# Patient Record
Sex: FEMALE | Race: BLACK OR AFRICAN AMERICAN
[De-identification: names, ages, dates, MRNs, and addresses within clinical notes are randomized per-mention and may not be internally consistent; named-entity substitution may affect disease eponyms.]

---

## 2017-01-17 ENCOUNTER — HOSPITAL ENCOUNTER (EMERGENCY)
Dept: HOSPITAL 47 - EC | Age: 27
Discharge: HOME | End: 2017-01-17
Payer: COMMERCIAL

## 2017-01-17 VITALS — HEART RATE: 79 BPM | SYSTOLIC BLOOD PRESSURE: 121 MMHG | DIASTOLIC BLOOD PRESSURE: 68 MMHG | TEMPERATURE: 98.1 F

## 2017-01-17 VITALS — RESPIRATION RATE: 18 BRPM

## 2017-01-17 DIAGNOSIS — M54.5: ICD-10-CM
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
## 2017-01-17 DIAGNOSIS — S16.1XXA: Primary | ICD-10-CM

## 2017-01-17 DIAGNOSIS — R51: ICD-10-CM

## 2017-01-17 DIAGNOSIS — F17.200: ICD-10-CM

## 2017-01-17 PROCEDURE — 71020: CPT

## 2017-01-17 PROCEDURE — 93005 ELECTROCARDIOGRAM TRACING: CPT

## 2017-01-17 PROCEDURE — 96372 THER/PROPH/DIAG INJ SC/IM: CPT

## 2017-01-17 PROCEDURE — 99284 EMERGENCY DEPT VISIT MOD MDM: CPT

## 2017-01-17 PROCEDURE — 72050 X-RAY EXAM NECK SPINE 4/5VWS: CPT

## 2017-01-17 PROCEDURE — 72100 X-RAY EXAM L-S SPINE 2/3 VWS: CPT

## 2017-01-17 NOTE — XR
EXAMINATION TYPE: XR cervical spine comp

 

DATE OF EXAM: 1/17/2017 5:33 PM

 

COMPARISON: NONE

 

HISTORY: Neck pain

 

TECHNIQUE: 5 views

 

FINDINGS: The cervical vertebra have normal spacing and alignment. Posterior elements are intact. Brent
antoaxial facet joint is normal. There are no cervical ribs.

 

IMPRESSION: Normal cervical spine.

## 2017-01-17 NOTE — XR
EXAMINATION TYPE: XR chest 2V

 

DATE OF EXAM: 1/17/2017 5:33 PM

 

COMPARISON: 3/8/2012

 

HISTORY: Back pain

 

TECHNIQUE:  Frontal and lateral views of the chest are obtained.

 

FINDINGS:  Heart and mediastinum are normal. Lungs are clear. Costophrenic angles are clear. There ar
e no hilar masses. Bony thorax is intact. Exam is limited by patient size. There are chest leads.

 

IMPRESSION:  No active cardiopulmonary disease. No change.
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
## 2017-01-17 NOTE — XR
EXAMINATION TYPE: XR lumbar spine 2 or 3V

 

DATE OF EXAM: 1/17/2017 5:33 PM

 

COMPARISON: NONE

 

HISTORY: Back pain

 

TECHNIQUE: 3 views

 

FINDINGS: The lumbar vertebra have normal spacing and alignment. Posterior elements appear intact. Sa
croiliac joints appear normal. There is some narrowing and mild spurring at T12-L1 disc space.

 

IMPRESSION: Negative lumbar spine exam. There is mild disc space narrowing noted at T12-L1.

## 2018-10-04 ENCOUNTER — HOSPITAL ENCOUNTER (EMERGENCY)
Dept: HOSPITAL 47 - EC | Age: 28
Discharge: HOME | End: 2018-10-04
Payer: COMMERCIAL

## 2018-10-04 VITALS — HEART RATE: 75 BPM | SYSTOLIC BLOOD PRESSURE: 131 MMHG | DIASTOLIC BLOOD PRESSURE: 91 MMHG | TEMPERATURE: 98.6 F

## 2018-10-04 VITALS — RESPIRATION RATE: 16 BRPM

## 2018-10-04 DIAGNOSIS — F17.200: ICD-10-CM

## 2018-10-04 DIAGNOSIS — Z32.01: Primary | ICD-10-CM

## 2018-10-04 DIAGNOSIS — Z98.51: ICD-10-CM

## 2018-10-04 PROCEDURE — 99282 EMERGENCY DEPT VISIT SF MDM: CPT

## 2018-10-04 PROCEDURE — 81025 URINE PREGNANCY TEST: CPT

## 2018-10-04 NOTE — ED
General Adult HPI





- General


Chief complaint: Recheck/Abnormal Lab/Rx


Stated complaint: Female 


Source: patient


Mode of arrival: ambulatory


Limitations: no limitations





- History of Present Illness


Initial comments: 





Dictation was produced using dragon dictation software. please excuse any 

grammatical, word or spelling errors. 





Chief Complaint: 28-year-old  [place] female  presents with positive 

urine pregnancy tests at home despite bilateral tubal ligation.





History of Present Illness: 28-year-old female sexually active.  She presents 

with a positive urine pregnancy test.  She states that she missed her period 

which was also performed days ago.  She's had a bilateral tubal ligation 

approximately 7 years ago.  She checked a urine pregnancy test which was 

positive at home.  Patient has no other complaints at this time.








The ROS documented in this emergency department record has been reviewed and 

confirmed by me.  Those systems with pertinent positive or negative responses 

have been documented in the HPI.  All other systems are other negative and/or 

noncontributory.





- Related Data


 Home Medications











 Medication  Instructions  Recorded  Confirmed


 


Multivitamin with Iron 1 tab PO DAILY 10/04/18 10/04/18





[Multivitamins with Iron]   











 Allergies











Allergy/AdvReac Type Severity Reaction Status Date / Time


 


No Known Allergies Allergy   Verified 10/04/18 21:51














Review of Systems


ROS Statement: 


Those systems with pertinent positive or pertinent negative responses have been 

documented in the HPI.





ROS Other: All systems not noted in ROS Statement are negative.





Past Medical History


Past Medical History: No Reported History


History of Any Multi-Drug Resistant Organisms: None Reported


Past Surgical History: Cholecystectomy, Tubal Ligation


Past Psychological History: No Psychological Hx Reported


Smoking Status: Current every day smoker


Past Alcohol Use History: Occasional


Past Drug Use History: None Reported





General Exam





- General Exam Comments


Initial Comments: 














PHYSICAL EXAM:


General Impression: Alert and oriented x3, not in acute distress


HEENT: Normocephalic atraumatic, extra-ocular movements intact, pupils equal 

and reactive to light bilaterally, mucous membranes moist.


Cardiovascular: Heart regular rate and rhythm, S1&S2 audible, no murmurs, rubs 

or gallops


Chest: Lungs clear to auscultation bilaterally, no rhonchi, no wheeze, no rales


Abdomen: Bowel sounds present, abdomen soft, non-tender, non-distended, no 

organomegaly


Musculoskeletal: Pulses present and equal in all extremities, no peripheral 

edema


Motor: Power 5/5 bilaterally, no focal deficits noted


Neurological: CN II-XII grossly intact, no focal motor or sensory deficits noted


Skin: Intact with no visualized rashes


Psych: Normal affect and mood


Limitations: no limitations





Course


 Vital Signs











  10/04/18





  21:34


 


Temperature 98.7 F


 


Pulse Rate 88


 


Respiratory 16





Rate 


 


Blood Pressure 138/72


 


O2 Sat by Pulse 99





Oximetry 














Medical Decision Making





- Medical Decision Making





ED course: 28-year-old female presents with positive urine pregnancy tests at 

home status post bilateral tubal ligation.  She has no other complaints at this 

time.  Vital signs are within normal limits.  Urine pregnancy test is negative.

  Patient has no other complaints.  No further workup indicated at this time.  

Patient told to recheck a urine pregnancy about 7-10 days to confirm.  Patient 

advised to follow-up with primary care physician upon discharge.  Told to 

return if the development of any pelvic pain or vaginal discharge.  Patient 

understandable agreeable to disposition.  All questions dressed.














- Lab Data


 Lab Results











  10/04/18 Range/Units





  21:36 


 


Urine HCG, Qual  Not Detected  (Not Detectd)  














Disposition


Clinical Impression: 


 Positive urine pregnancy test





Disposition: HOME SELF-CARE


Condition: Good


Instructions:  Pregnancy (ED)


Is patient prescribed a controlled substance at d/c from ED?: No


Referrals: 


Stephan Fowler MD [Primary Care Provider] - 1-2 days


Time of Disposition: 22:29